# Patient Record
Sex: MALE | Race: WHITE | URBAN - METROPOLITAN AREA
[De-identification: names, ages, dates, MRNs, and addresses within clinical notes are randomized per-mention and may not be internally consistent; named-entity substitution may affect disease eponyms.]

---
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
## 2023-03-07 ENCOUNTER — PROCEDURE VISIT (OUTPATIENT)
Dept: CARDIOLOGY CLINIC | Age: 18
End: 2023-03-07

## 2023-03-07 DIAGNOSIS — Z02.89 ENCOUNTER FOR OCCUPATIONAL PHYSICAL EXAMINATION: ICD-10-CM

## 2023-03-07 DIAGNOSIS — Z02.89 ENCOUNTER FOR OCCUPATIONAL PHYSICAL EXAMINATION: Primary | ICD-10-CM

## 2025-04-24 ENCOUNTER — HOSPITAL ENCOUNTER (EMERGENCY)
Age: 20
Discharge: HOME | End: 2025-04-24
Payer: COMMERCIAL

## 2025-04-24 VITALS
SYSTOLIC BLOOD PRESSURE: 139 MMHG | RESPIRATION RATE: 15 BRPM | OXYGEN SATURATION: 99 % | HEART RATE: 79 BPM | DIASTOLIC BLOOD PRESSURE: 84 MMHG | TEMPERATURE: 97.7 F

## 2025-04-24 DIAGNOSIS — H66.001 NON-RECURRENT ACUTE SUPPURATIVE OTITIS MEDIA OF RIGHT EAR WITHOUT SPONTANEOUS RUPTURE OF TYMPANIC MEMBRANE: Primary | ICD-10-CM

## 2025-04-24 PROCEDURE — 6370000000 HC RX 637 (ALT 250 FOR IP): Performed by: NURSE PRACTITIONER

## 2025-04-24 PROCEDURE — 99283 EMERGENCY DEPT VISIT LOW MDM: CPT

## 2025-04-24 RX ORDER — FLUTICASONE PROPIONATE 50 MCG
2 SPRAY, SUSPENSION (ML) NASAL DAILY PRN
Qty: 16 G | Refills: 3 | Status: SHIPPED | OUTPATIENT
Start: 2025-04-24

## 2025-04-24 RX ORDER — ACETAMINOPHEN 325 MG/1
650 TABLET ORAL ONCE
Status: COMPLETED | OUTPATIENT
Start: 2025-04-24 | End: 2025-04-24

## 2025-04-24 RX ADMIN — ACETAMINOPHEN 650 MG: 325 TABLET ORAL at 22:28

## 2025-04-24 RX ADMIN — AMOXICILLIN AND CLAVULANATE POTASSIUM 1 TABLET: 875; 125 TABLET, FILM COATED ORAL at 22:28

## 2025-04-24 ASSESSMENT — PAIN DESCRIPTION - ORIENTATION: ORIENTATION: LEFT

## 2025-04-24 ASSESSMENT — PAIN DESCRIPTION - LOCATION: LOCATION: EAR

## 2025-04-24 ASSESSMENT — PAIN SCALES - GENERAL: PAINLEVEL_OUTOF10: 5

## 2025-04-24 ASSESSMENT — PAIN DESCRIPTION - DESCRIPTORS: DESCRIPTORS: SHARP;PRESSURE

## 2025-04-25 NOTE — ED PROVIDER NOTES
[facility] EMERGENCY DEPARTMENT  EMERGENCY DEPARTMENT ENCOUNTER        Pt Name: Lei Hu  MRN: [id number]  Birthdate 2005  Date of evaluation: 4/24/2025  Provider: THEODORE Mortensen - LANCE  PCP: Raymundo Lipscomb MD  Note Started: 10:13 PM EDT 4/24/25      ROBERTO. I have evaluated this patient.        CHIEF COMPLAINT       Chief Complaint   Patient presents with    Ear Pain     Left ear pain, history of ruptured ear drum, congestion with increased pain and bloody drainage       HISTORY OF PRESENT ILLNESS: 1 or more Elements     History From: Patient    Limitations to history : None    Social Determinants Significantly Affecting Health : None    Chief Complaint: Left ear pain    Lei Hu is a 20 y.o. male who presents to the emergency department today with symptoms of left ear pain.  Patient states he has symptoms of sinus congestion sinus pressure this been ongoing for the last few days.  Started with symptoms of ear pain last 24 hours.  States he noted a little bit of bloody drainage from the ear.  Denies any known ear trauma.  No other acute concerns.    Nursing Notes were all reviewed and agreed with or any disagreements were addressed in the HPI.    REVIEW OF SYSTEMS :      Review of Systems    Positives and Pertinent negatives as per HPI.     SURGICAL HISTORY   No past surgical history on file.    CURRENTMEDICATIONS       Previous Medications    No medications on file       ALLERGIES     Patient has no known allergies.    FAMILYHISTORY     No family history on file.     SOCIAL HISTORY       Social History     Tobacco Use    Smoking status: Never    Smokeless tobacco: Never   Substance Use Topics    Alcohol use: No    Drug use: No       SCREENINGS     NIHSS:     GCS: Camille Coma Scale  Eye Opening: Spontaneous  Best Verbal Response: Oriented  Best Motor Response: Obeys commands  Webster Coma Scale Score: 15    Heart Score      PECARN Last:       CIWA: CIWA Assessment  BP: